# Patient Record
Sex: FEMALE
[De-identification: names, ages, dates, MRNs, and addresses within clinical notes are randomized per-mention and may not be internally consistent; named-entity substitution may affect disease eponyms.]

---

## 2019-06-28 ENCOUNTER — TELEPHONE (OUTPATIENT)
Dept: SCHEDULING | Age: 24
End: 2019-06-28

## 2019-09-30 ENCOUNTER — HOSPITAL ENCOUNTER (EMERGENCY)
Facility: HOSPITAL | Age: 24
Discharge: STILL ADMITTED OR ADMINISTRATIVE | End: 2019-09-30
Payer: COMMERCIAL
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
## 2020-02-18 ENCOUNTER — HOSPITAL ENCOUNTER (OUTPATIENT)
Age: 25
Discharge: HOME | End: 2020-02-18
Attending: EMERGENCY MEDICINE
Payer: COMMERCIAL

## 2020-02-18 VITALS
OXYGEN SATURATION: 98 % | TEMPERATURE: 98 F | RESPIRATION RATE: 20 BRPM | WEIGHT: 142 LBS | HEART RATE: 84 BPM | BODY MASS INDEX: 24 KG/M2 | SYSTOLIC BLOOD PRESSURE: 106 MMHG | DIASTOLIC BLOOD PRESSURE: 69 MMHG

## 2020-02-18 DIAGNOSIS — R07.0 THROAT PAIN: Primary | ICD-10-CM

## 2020-02-18 LAB — S PYO AG THROAT QL: POSITIVE

## 2020-02-18 PROCEDURE — 99214 OFFICE O/P EST MOD 30 MIN: CPT

## 2020-02-18 PROCEDURE — 87430 STREP A AG IA: CPT

## 2020-02-18 PROCEDURE — 99213 OFFICE O/P EST LOW 20 MIN: CPT

## 2020-02-18 RX ORDER — AZITHROMYCIN 250 MG/1
TABLET, FILM COATED ORAL
Qty: 1 PACKAGE | Refills: 0 | Status: SHIPPED | OUTPATIENT
Start: 2020-02-18 | End: 2020-02-23

## 2020-02-18 NOTE — ED PROVIDER NOTES
Patient Seen in: [address]      History   Patient presents with:  Sore Throat    Stated Complaint: sorethroat    HPI    This patient complains of sore throat for the past day. She states that she has had a headache.   Pa indicated.               Disposition and Plan     Clinical Impression:  Throat pain  (primary encounter diagnosis)    Disposition:  Discharge  2/18/2020 12:42 pm    Follow-up:  Sebastien Esqueda DO  [id number] [phone]

## 2023-05-10 PROBLEM — R30.9 URINATION PAIN: Status: ACTIVE | Noted: 2023-05-10

## (undated) NOTE — LETTER
Date & Time: 2/18/2020, 12:45 PM  Patient: Maximo Tomas  Encounter Provider(s):    Hung Membreno MD       To Whom It May Concern:    Sirena Hare was seen and treated in our department on 2/18/2020.  She can return to work on Feb. 20, 2020

## (undated) NOTE — ED AVS SNAPSHOT
Ms. Stanford Hutchinson   MRN: [id number]    Department:  [facility]   Date of Visit:  9/30/2019           Disclosure     Insurance plans vary and the physician(s) referred by the ER may not be covered by your plan.  Please CARE PHYSICIAN AT ONCE OR RETURN IMMEDIATELY TO THE EMERGENCY DEPARTMENT. If you have been prescribed any medication(s), please fill your prescription right away and begin taking the medication(s) as directed.   If you believe that any of the medications